# Patient Record
Sex: FEMALE | Race: ASIAN
[De-identification: names, ages, dates, MRNs, and addresses within clinical notes are randomized per-mention and may not be internally consistent; named-entity substitution may affect disease eponyms.]

---

## 2018-07-09 ENCOUNTER — HOSPITAL ENCOUNTER (OUTPATIENT)
Dept: HOSPITAL 62 - WI | Age: 53
End: 2018-07-09
Attending: SPECIALIST
Payer: COMMERCIAL

## 2018-07-09 DIAGNOSIS — Z12.31: Primary | ICD-10-CM

## 2018-07-09 PROCEDURE — 77067 SCR MAMMO BI INCL CAD: CPT

## 2018-07-09 PROCEDURE — 77063 BREAST TOMOSYNTHESIS BI: CPT

## 2018-07-11 ENCOUNTER — HOSPITAL ENCOUNTER (EMERGENCY)
Dept: HOSPITAL 62 - ER | Age: 53
LOS: 1 days | Discharge: HOME | End: 2018-07-12
Payer: COMMERCIAL

## 2018-07-11 DIAGNOSIS — R51: Primary | ICD-10-CM

## 2018-07-11 DIAGNOSIS — I10: ICD-10-CM

## 2018-07-11 DIAGNOSIS — Z88.6: ICD-10-CM

## 2018-07-11 DIAGNOSIS — Z88.2: ICD-10-CM

## 2018-07-11 DIAGNOSIS — R11.10: ICD-10-CM

## 2018-07-11 PROCEDURE — 89050 BODY FLUID CELL COUNT: CPT

## 2018-07-11 PROCEDURE — 84484 ASSAY OF TROPONIN QUANT: CPT

## 2018-07-11 PROCEDURE — 009U3ZX DRAINAGE OF SPINAL CANAL, PERCUTANEOUS APPROACH, DIAGNOSTIC: ICD-10-PCS

## 2018-07-11 PROCEDURE — 83735 ASSAY OF MAGNESIUM: CPT

## 2018-07-11 PROCEDURE — 93010 ELECTROCARDIOGRAM REPORT: CPT

## 2018-07-11 PROCEDURE — 96375 TX/PRO/DX INJ NEW DRUG ADDON: CPT

## 2018-07-11 PROCEDURE — 96361 HYDRATE IV INFUSION ADD-ON: CPT

## 2018-07-11 PROCEDURE — 85025 COMPLETE CBC W/AUTO DIFF WBC: CPT

## 2018-07-11 PROCEDURE — 62270 DX LMBR SPI PNXR: CPT

## 2018-07-11 PROCEDURE — 70450 CT HEAD/BRAIN W/O DYE: CPT

## 2018-07-11 PROCEDURE — 80053 COMPREHEN METABOLIC PANEL: CPT

## 2018-07-11 PROCEDURE — 81001 URINALYSIS AUTO W/SCOPE: CPT

## 2018-07-11 PROCEDURE — 99284 EMERGENCY DEPT VISIT MOD MDM: CPT

## 2018-07-11 PROCEDURE — S0119 ONDANSETRON 4 MG: HCPCS

## 2018-07-11 PROCEDURE — 36415 COLL VENOUS BLD VENIPUNCTURE: CPT

## 2018-07-11 PROCEDURE — 96374 THER/PROPH/DIAG INJ IV PUSH: CPT

## 2018-07-11 PROCEDURE — 93005 ELECTROCARDIOGRAM TRACING: CPT

## 2018-07-11 PROCEDURE — 96372 THER/PROPH/DIAG INJ SC/IM: CPT

## 2018-07-11 PROCEDURE — 82550 ASSAY OF CK (CPK): CPT

## 2018-07-11 PROCEDURE — 70544 MR ANGIOGRAPHY HEAD W/O DYE: CPT

## 2018-07-11 NOTE — ER DOCUMENT REPORT
ED Medical Screen (RME)





- General


Chief Complaint: High Blood Pressure


Stated Complaint: BLOOD PRESSURE ISSUES


Time Seen by Provider: 07/11/18 23:16


Mode of Arrival: Wheelchair


Information source: Patient


Notes: 





53-year-old female presented to ED for the complaint of severe headache jaw 

pain after she was outside for about 6 hours.  She states her head started 

throbbing and she took her blood pressure and it was very high.  It is been 

over 200 since that time.  When I checked her blood pressure in the pit area.  

Her blood pressure was 187/95.  She is alert and oriented respirations regular 

and unlabored speaking in full sentences.  She states she feels very weak and 

she is in a wheelchair.  Lungs were clear to auscultation.  States she has 

taken her blood pressure medicine lisinopril and a pain medicine tramadol still 

has a bad headache.








I have greeted and performed a rapid initial assessment of this patient.  A 

comprehensive ED assessment and evaluation of the patient, analysis of test 

results and completion of medical decision making process will be conducted by 

an additional ED providers.


TRAVEL OUTSIDE OF THE [place] IN LAST 30 DAYS: No





- Related Data


Allergies/Adverse Reactions: 


 





codeine Allergy (Verified 03/09/17 11:43)


 


Sulfa (Sulfonamide Antibiotics) Allergy (Verified 03/09/17 11:43)


 











Past Medical History





- Past Medical History


Cardiac Medical History: Reports: Hx Hypertension


Neurological Medical History: Reports: Hx Migraine


Psychiatric Medical History: Reports: Hx Depression


Past Surgical History: Reports: Hx Orthopedic Surgery - back





- Immunizations


Hx Diphtheria, Pertussis, Tetanus Vaccination: Yes





Physical Exam





- Vital signs


Vitals: 





 











Temp Pulse Resp BP Pulse Ox


 


 98.0 F   95   18   223/122 H  98 


 


 07/11/18 22:27  07/11/18 22:27  07/11/18 22:27  07/11/18 22:27  07/11/18 22:27














Course





- Vital Signs


Vital signs: 





 











Temp Pulse Resp BP Pulse Ox


 


 98.0 F   95   18   187/95 H  98 


 


 07/11/18 22:27  07/11/18 22:27  07/11/18 22:27  07/11/18 23:13  07/11/18 22:27














Doctor's Discharge





- Discharge


Referrals: 


JAQUELINE TAPIA MD [Primary Care Provider] - Follow up as needed

## 2018-07-11 NOTE — ER DOCUMENT REPORT
ED General





- General


Chief Complaint: High Blood Pressure


Stated Complaint: BLOOD PRESSURE ISSUES


Time Seen by Provider: 07/11/18 23:16


Mode of Arrival: Wheelchair


Notes: 





Patient is 53-year-old female presents with plan of headache and high blood 

pressure.  Patient says that she does have blood pressure medicine prescribed 

for her but she only takes it when her blood pressure is high.  She did take it 

today when she knows her blood pressure is high.  She is on lisinopril and 

hydrochlorothiazide.  She said most days she does not have to take her blood 

pressure as her blood pressure only elevates if she is having pain.  She was 

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  She 

started to develop a headache which gradually worsened throughout the day.  

Became more severe and then she started having vomiting therefore came into the 

ER.  She does have history of migraines but says her migraines are different 

and that they usually calls blurred vision.  She used to be on Fioricet for 

migraines which was prescribed by her neurologist, Dr. Cain.  She does not 

have any Fioricet at this time.  She denies any fevers.  No trauma to her head.

  No other complaints at this time.  No focal weakness or numbness.  She says 

with the headache she has a sensation of clenching throughout her jaw.


TRAVEL OUTSIDE OF THE [place] IN LAST 30 DAYS: No





- Related Data


Allergies/Adverse Reactions: 


 





codeine Allergy (Verified 03/09/17 11:43)


 


Sulfa (Sulfonamide Antibiotics) Allergy (Verified 03/09/17 11:43)


 











Past Medical History





- General


Information source: Patient





- Social History


Smoking Status: Never Smoker


Frequency of alcohol use: None


Drug Abuse: None


Family History: Other





- Past Medical History


Cardiac Medical History: Reports: Hx Hypertension


Neurological Medical History: Reports: Hx Migraine


Psychiatric Medical History: Reports: Hx Depression


Past Surgical History: Reports: Hx Orthopedic Surgery - back





- Immunizations


Hx Diphtheria, Pertussis, Tetanus Vaccination: Yes





Review of Systems





- Review of Systems


Notes: 





My Normal Review Basic





REVIEW OF SYSTEMS:


CONSTITUTIONAL :  Denies fever,  chills, or sweats.  Denies recent illness.


EENT:   Denies eye, ear, throat, or mouth pain or symptoms.  Denies nasal or 

sinus congestion.


CARDIOVASCULAR:  Denies chest pain.


RESPIRATORY:  Denies cough, cold, or chest congestion.  Denies shortness of 

breath, difficulty breathing, or wheezing.


GASTROINTESTINAL:  Denies abdominal pain.  Some vomiting


MUSCULOSKELETAL:  Denies neck or back pain or joint pain or swelling.


SKIN:   Denies rash or skin lesions.


NEUROLOGICAL:  Denies altered mental status or loss of consciousness.  Has a 

headache.  Denies weakness or paralysis or loss of use of either side.  Denies 

problems with gait or speech.  Denies sensory or motor loss.


ALL OTHER SYSTEMS REVIEWED AND NEGATIVE.





Physical Exam





- Vital signs


Vitals: 


 











Temp Pulse Resp BP Pulse Ox


 


 98.0 F   95   18   223/122 H  98 


 


 07/11/18 22:27  07/11/18 22:27  07/11/18 22:27  07/11/18 22:27  07/11/18 22:27














- Notes


Notes: 





General Appearance: Well nourished, alert, cooperative, no acute distress, 

moderate obvious discomfort.


Vitals: reviewed, See vital signs table.


Head: no swelling or tenderness to the head


Eyes: PERRL, EOMI, Conjuctiva clear


Mouth: No decreasd moisture


Throat: No tonsillar inflammation, No airway obstruction,  No lymphadenopathy


Neck: Supple, no neck tenderness


Lungs: No wheezing, No rales, No rhonci, No accessory muscle use, good air 

exchange bilaterally.


Heart: Normal rate, Regular rythm, No murmur, no rub


Abdomen: Normal BS, soft, No rigidity, No abdominal tenderness, No guarding, no 

rebound, 


Extremities: strength 5/5 in all extremities, good pulses in all extremities, 

no swelling or tenderness in the extremities, no edema.


Skin: warm, dry, appropriate color, no rash


Neuro: speech clear, oriented x 3, normal affect, responds appropriately to 

questions.  No nerves II through XII are intact.  Distal sensation intact.  

Patient moves all extremities without difficulty.  Normal Romberg.





Course





- Re-evaluation


Re-evalutation: 





07/12/18 02:19


On reevaluation the patient's blood pressure is much improved but she still has 

a bad headache as well as still some vomiting.  She has a history of migraines 

and took Fioricet for migraines but says this headache is little different than 

her migraines and that her migraines usually cause visual changes and this 

headache is not causing any visual changes.  I will try Benadryl and Reglan 

because of the vomiting.  Hopefully this will help.  If not, then will try 

Fioricet.  Patient's CT scan did not show any concerning findings


07/12/18 03:39








Patient says her headache is improving but still there.  I will give her a dose 

of Fioricet as this has worked for her with her migraines in the past.  She 

still has a lot of nausea.  I will try Zofran and see if this helps.


07/12/18 04:56








Patient still has the headache.  Initially in patient's history she told me 

that it was a gradual onset headache.  I have assessed her and I am concerned 

that she continues have a headache despite multiple different kind of 

medications.  I asked her again about the headache and one of her history 

again.  Patient also is that the headache was actually sudden in onset.  This 

makes me more concerned for possible subarachnoid hemorrhage.  I did talk to 

patient about lumbar puncture and set I strongly recommended that we go forward 

with this and she is agreeable to it.  Is not taking any blood thinning 

medications.  She has had epidurals in the past.  She is understanding of the 

risks and benefits.





Dictation of this chart was performed using voice recognition software; 

therefore, there may be some unintended grammatical errors.


07/12/18 05:28








Lumbar puncture is completed.  Patient tolerated well.  We will have patient 

lay flat and give her a liter of IV fluids.


07/13/18 05:43








She has lumbar puncture suggested this was not a subarachnoid hemorrhage or 

bleeding.  Her headache was started become much better at the time when I 

believe the morning.  I did order MRI just to rule out venous osteoporosis.  

She was checked out the morning provider, , that she be discharged home 

if her MRI was negative and she was feeling improved.  Patient's MRI was 

negative and she was discharged home.  A wrote her a prescription for Fioricet 

as this has worked for migraines in the past.  I suspect most likely her 

headache was related to heat exhaustion.  Talked to the patient for left 

informed of the plan to get the MRI.  Also informed of my concern at this point 

was heat exhaustion related that she should feel heat for the next several 

days.  The time patient admitted that she was feeling better did want to go 

home after the MRI.  Informed her that should I still want to have a low 

threshold to return to ER if she has worsening recurrent headaches, vomiting, 

or if she felt unwell.  Patient agreed with plan.





Dictation of this chart was performed using voice recognition software; 

therefore, there may be some unintended grammatical errors.





- Vital Signs


Vital signs: 


 











Temp Pulse Resp BP Pulse Ox


 


 97.6 F   95   25 H  119/82   99 


 


 07/12/18 06:10  07/11/18 22:27  07/12/18 08:05  07/12/18 09:29  07/12/18 09:29














- Laboratory


Result Diagrams: 


 07/11/18 23:50





 07/12/18 00:42


Laboratory results interpreted by me: 


 











  07/11/18 07/12/18 07/12/18





  23:50 00:09 00:42


 


Seg Neutrophils %  81.0 H  


 


Glucose    118 H


 


Urine Ketones   20 H 














- EKG Interpretation by Me


Additional EKG results interpreted by me: 





07/11/18 23:29


EKG is reviewed and interpreted by me.  EKG shows sinus rhythm with rate of 79 

bpm.  No ST segment elevation or depression.  No ischemic T wave inversions.  

NY interval, QRS duration, QTc intervals are within normal range.  No acute 

changes comparison patient's old EKG from March 10, 2017.





Procedures





- Lumbar Puncture


  ** Lumbar puncture


Consent obtained: Yes


Lumbar puncture pre-procedure: Chloraprep applied


Patient position: Lying


Lumbar puncture location: L4-L5 interspace


Anesthetic type: 1% Lidocaine


mL's of anesthetic: 2


Amount/type of drainage: clear 6mls


Number of attempts: 1


Complications: No





Discharge





- Discharge


Clinical Impression: 


 Essential hypertension





Headache


Qualifiers:


 Headache type: unspecified Headache chronicity pattern: acute headache 

Intractability: not intractable Qualified Code(s): R51 - Headache





Disposition: HOME, SELF-CARE


Additional Instructions: 


Please stay out of the heat and rest over the next 24 hours. Please return to 

the ER if you have worsening recurrent headaches, intractable vomiting, fevers, 

or feel unwell. Do not drive when taking the Fioricet.


Prescriptions: 


Butalb/Acetaminophen/Caffeine [Fioricet (-40 mg) Tablet] 1 tab PO Q4HP 

PRN #15 tab


 PRN Reason: 


Forms:  Return to Work


Referrals: 


JAQUELINE TAPIA MD [EMERITUS] - Follow up in 3-5 days

## 2018-07-11 NOTE — WOMENS IMAGING REPORT
EXAM DESCRIPTION:  3D SCREENING MAMMO BILAT



COMPLETED DATE/TIME:  7/9/2018 2:52 pm



REASON FOR STUDY:  BILATERAL MAMMO SCREENING 3D/ Z12.31 Z12.31  ENCNTR SCREEN MAMMOGRAM FOR MALIGNANT
 NEOPLASM OF WOJCIECH



COMPARISON:  Multiple since 2009



TECHNIQUE:  Standard craniocaudal and mediolateral oblique views of each breast recorded using digita
l acquisition and breast tomosynthesis.



LIMITATIONS:  None.



FINDINGS:  No masses, calcifications or architectural distortion. No areas of suspicion.

Read with the assistance of CAD.

.[facility] - R2 Cenova Version 1.3

.[facility] - R2 Cenova Version 1.3

.[facility] - R2 Cenova Version 2.4

.[facility] - R2 Cenova Version 2.4

.[facility] - R2  Version 9.2



IMPRESSION:  NORMAL MAMMOGRAM.  BIRADS 1.



BREAST DENSITY:  d. The breasts are extremely dense, which lowers the sensitivity of mammography.



BIRAD:  1 NEGATIVE



RECOMMENDATION:  ROUTINE SCREENING

Please continue yearly bilateral screening tomosynthesis in July 2019



COMMENT:  The patient has been notified of the results by letter per SA requirements. Additional no
tification policies are in place for contacting patient with suspicious or incomplete findings.

Quality ID #[id number]: The American College of Radiology recommends an annual screening mammogram for women
 aged 40 years or over. This facility utilizes a reminder system to ensure that all patients receive 
reminder letters, and/or direct phone calls for appointments. This includes reminders for routine scr
eening mammograms, diagnostic mammograms, or other Breast Imaging Interventions when appropriate.  Th
is patient will be placed in the appropriate reminder system.

The American College of Radiology (ACR) has developed recommendations for screening MRI of the breast
s in certain patient populations, to be used in conjunction with mammography.  Breast MRI surveillanc
e may be appropriate for women with more than 20% lifetime risk of developing breast cancer  as deter
mined by genetic testing, significant family history of the disease, or history of mantle radiation f
or Hodgkins Disease.  ACR Practice Guidelines 2008.

[employer]



PQRS 6045F: Fluoroscopic imaging is not utilized for breast tomosynthesis.



TECHNICAL DOCUMENTATION:  FINDING NUMBER: (1)

ASSESSMENT:  (1)

JOB ID:  [id number]

 2011 [employer]- All Rights Reserved



Reading location - IP/workstation name: [facility]

## 2018-07-12 VITALS — SYSTOLIC BLOOD PRESSURE: 119 MMHG | DIASTOLIC BLOOD PRESSURE: 82 MMHG

## 2018-07-12 LAB
ADD MANUAL DIFF: NO
ALBUMIN SERPL-MCNC: 4.7 G/DL (ref 3.5–5)
ALP SERPL-CCNC: 85 U/L (ref 38–126)
ALT SERPL-CCNC: 20 U/L (ref 9–52)
ANION GAP SERPL CALC-SCNC: 16 MMOL/L (ref 5–19)
APPEARANCE ALL TUBES: CLEAR
APPEARANCE ALL TUBES: CLEAR
APPEARANCE UR: CLEAR
APTT PPP: (no result) S
AST SERPL-CCNC: 26 U/L (ref 14–36)
BASOPHILS # BLD AUTO: 0 10^3/UL (ref 0–0.2)
BASOPHILS NFR BLD AUTO: 0.3 % (ref 0–2)
BILIRUB DIRECT SERPL-MCNC: 0.3 MG/DL (ref 0–0.4)
BILIRUB SERPL-MCNC: 0.6 MG/DL (ref 0.2–1.3)
BILIRUB UR QL STRIP: NEGATIVE
BUN SERPL-MCNC: 10 MG/DL (ref 7–20)
CALCIUM: 9.5 MG/DL (ref 8.4–10.2)
CHLORIDE SERPL-SCNC: 99 MMOL/L (ref 98–107)
CK SERPL-CCNC: 85 U/L (ref 30–135)
CO2 SERPL-SCNC: 25 MMOL/L (ref 22–30)
COLOR ALL TUBES: COLORLESS
COLOR ALL TUBES: COLORLESS
CSF TOTAL VOLUME: 7 CC
CSF TOTAL VOLUME: 7 CC
CSF TUBE NUMBER: 1
CSF TUBE NUMBER: 3
EOSINOPHIL # BLD AUTO: 0 10^3/UL (ref 0–0.6)
EOSINOPHIL NFR BLD AUTO: 0.4 % (ref 0–6)
ERYTHROCYTE [DISTWIDTH] IN BLOOD BY AUTOMATED COUNT: 12.7 % (ref 11.5–14)
GLUCOSE SERPL-MCNC: 118 MG/DL (ref 75–110)
GLUCOSE UR STRIP-MCNC: NEGATIVE MG/DL
HCT VFR BLD CALC: 42.5 % (ref 36–47)
HGB BLD-MCNC: 14.5 G/DL (ref 12–15.5)
KETONES UR STRIP-MCNC: 20 MG/DL
LYMPHOCYTES # BLD AUTO: 1.3 10^3/UL (ref 0.5–4.7)
LYMPHOCYTES NFR BLD AUTO: 14.1 % (ref 13–45)
MCH RBC QN AUTO: 28.6 PG (ref 27–33.4)
MCHC RBC AUTO-ENTMCNC: 34 G/DL (ref 32–36)
MCV RBC AUTO: 84 FL (ref 80–97)
MONOCYTES # BLD AUTO: 0.4 10^3/UL (ref 0.1–1.4)
MONOCYTES NFR BLD AUTO: 4.2 % (ref 3–13)
NEUTROPHILS # BLD AUTO: 7.6 10^3/UL (ref 1.7–8.2)
NEUTS SEG NFR BLD AUTO: 81 % (ref 42–78)
NITRITE UR QL STRIP: NEGATIVE
PH UR STRIP: 6 [PH] (ref 5–9)
PLATELET # BLD: 260 10^3/UL (ref 150–450)
POTASSIUM SERPL-SCNC: 4.1 MMOL/L (ref 3.6–5)
PROT SERPL-MCNC: 8.1 G/DL (ref 6.3–8.2)
PROT UR STRIP-MCNC: NEGATIVE MG/DL
RBC # BLD AUTO: 5.06 10^6/UL (ref 3.72–5.28)
SODIUM SERPL-SCNC: 140.2 MMOL/L (ref 137–145)
SP GR UR STRIP: 1.01
TOTAL CELLS COUNTED % (AUTO): 100 %
UROBILINOGEN UR-MCNC: NEGATIVE MG/DL (ref ?–2)
VOLUME TUBE 1: 1.5 CC
VOLUME TUBE 1: 1.5 CC
VOLUME TUBE 2: 2 CC
VOLUME TUBE 2: 2 CC
VOLUME TUBE 3: 1.5 CC
VOLUME TUBE 3: 1.5 CC
VOLUME TUBE 4: 2 CC
VOLUME TUBE 4: 2 CC
WBC # BLD AUTO: 9.4 10^3/UL (ref 4–10.5)

## 2018-07-12 NOTE — RADIOLOGY REPORT (SQ)
EXAM DESCRIPTION: 



CT HEAD WITHOUT IV CONTRAST



COMPLETED DATE/TME:  07/11/2018 23:37



CLINICAL HISTORY: headache



COMPARISON: None available



TECHNIQUE: Axial CT of the head obtained from the skull apex to

the skull base without contrast.



FINDINGS: 

No acute intracranial hemorrhage identified. No mass, mass

effect, shift of the midline, abnormal extra-axial fluid

collection or CT evidence of acute ischemic change identified.

The ventricular system is unremarkable.  No acute abnormalities

of the supratentorial white matter, basal ganglia, cerebellum, or

brainstem.



The visualized paranasal sinuses and the mastoids are clear.

  No skull fracture identified.  Visualized orbits and globes are

unremarkable.



DLP:1070.38 mGy-cm



IMPRESSION:

1.  No acute intracranial abnormality identified.



This exam was performed according to our departmental

dose-optimization program, which includes automated exposure

control, adjustment of the mA and/or kV according to patient size

and/or use of iterative reconstruction technique.

## 2018-07-12 NOTE — RADIOLOGY REPORT (SQ)
EXAM DESCRIPTION:  MRA HEAD WITHOUT



COMPLETED DATE/TIME:  7/12/2018 8:40 am



REASON FOR STUDY:  venous phase to rule out venous sinus thrombosis



COMPARISON:  None.



TECHNIQUE:  Axial 3-D time-of-flight acquisition imaging performed through the brain in the venous si
nuses.  Images reformatted using 3-D MIPS.



LIMITATIONS:  None.



FINDINGS:  SOURCE IMAGES: No unexpected findings on source images.  No large masses.

3-D MIP: Normal flow be in the cerebral venous sinuses.  Specifically no evidence for sagittal sinus 
thrombosis.

OTHER: No other significant finding.



IMPRESSION:  Normal MRV of the cerebral venous sinuses.



TECHNICAL DOCUMENTATION:  JOB ID:  [id number]

 2011 [employer]- All Rights Reserved



Reading location - IP/workstation name: [email or web address]

## 2018-07-23 ENCOUNTER — HOSPITAL ENCOUNTER (EMERGENCY)
Dept: HOSPITAL 62 - ER | Age: 53
Discharge: HOME | End: 2018-07-23
Payer: COMMERCIAL

## 2018-07-23 VITALS — SYSTOLIC BLOOD PRESSURE: 145 MMHG | DIASTOLIC BLOOD PRESSURE: 86 MMHG

## 2018-07-23 DIAGNOSIS — R07.9: ICD-10-CM

## 2018-07-23 DIAGNOSIS — R55: Primary | ICD-10-CM

## 2018-07-23 DIAGNOSIS — Z88.5: ICD-10-CM

## 2018-07-23 DIAGNOSIS — Z88.2: ICD-10-CM

## 2018-07-23 DIAGNOSIS — I10: ICD-10-CM

## 2018-07-23 LAB
ADD MANUAL DIFF: NO
ALBUMIN SERPL-MCNC: 4.8 G/DL (ref 3.5–5)
ALP SERPL-CCNC: 90 U/L (ref 38–126)
ALT SERPL-CCNC: 21 U/L (ref 9–52)
ANION GAP SERPL CALC-SCNC: 14 MMOL/L (ref 5–19)
APPEARANCE UR: (no result)
APTT PPP: YELLOW S
AST SERPL-CCNC: 20 U/L (ref 14–36)
BARBITURATES UR QL SCN: NEGATIVE
BASOPHILS # BLD AUTO: 0 10^3/UL (ref 0–0.2)
BASOPHILS NFR BLD AUTO: 0.2 % (ref 0–2)
BILIRUB DIRECT SERPL-MCNC: 0.3 MG/DL (ref 0–0.4)
BILIRUB SERPL-MCNC: 0.6 MG/DL (ref 0.2–1.3)
BILIRUB UR QL STRIP: NEGATIVE
BUN SERPL-MCNC: 17 MG/DL (ref 7–20)
CALCIUM: 9.6 MG/DL (ref 8.4–10.2)
CHLORIDE SERPL-SCNC: 100 MMOL/L (ref 98–107)
CK MB SERPL-MCNC: 0.35 NG/ML (ref ?–4.55)
CK SERPL-CCNC: 63 U/L (ref 30–135)
CO2 SERPL-SCNC: 26 MMOL/L (ref 22–30)
EOSINOPHIL # BLD AUTO: 0 10^3/UL (ref 0–0.6)
EOSINOPHIL NFR BLD AUTO: 0.7 % (ref 0–6)
ERYTHROCYTE [DISTWIDTH] IN BLOOD BY AUTOMATED COUNT: 13 % (ref 11.5–14)
GLUCOSE SERPL-MCNC: 101 MG/DL (ref 75–110)
GLUCOSE UR STRIP-MCNC: NEGATIVE MG/DL
HCT VFR BLD CALC: 41.3 % (ref 36–47)
HGB BLD-MCNC: 13.9 G/DL (ref 12–15.5)
KETONES UR STRIP-MCNC: (no result) MG/DL
LYMPHOCYTES # BLD AUTO: 2 10^3/UL (ref 0.5–4.7)
LYMPHOCYTES NFR BLD AUTO: 30.5 % (ref 13–45)
MCH RBC QN AUTO: 28.6 PG (ref 27–33.4)
MCHC RBC AUTO-ENTMCNC: 33.7 G/DL (ref 32–36)
MCV RBC AUTO: 85 FL (ref 80–97)
METHADONE UR QL SCN: NEGATIVE
MONOCYTES # BLD AUTO: 0.3 10^3/UL (ref 0.1–1.4)
MONOCYTES NFR BLD AUTO: 4.9 % (ref 3–13)
NEUTROPHILS # BLD AUTO: 4.1 10^3/UL (ref 1.7–8.2)
NEUTS SEG NFR BLD AUTO: 63.7 % (ref 42–78)
NITRITE UR QL STRIP: NEGATIVE
PCP UR QL SCN: NEGATIVE
PH UR STRIP: 5 [PH] (ref 5–9)
PLATELET # BLD: 308 10^3/UL (ref 150–450)
POTASSIUM SERPL-SCNC: 4.6 MMOL/L (ref 3.6–5)
PROT SERPL-MCNC: 8.6 G/DL (ref 6.3–8.2)
PROT UR STRIP-MCNC: NEGATIVE MG/DL
RBC # BLD AUTO: 4.87 10^6/UL (ref 3.72–5.28)
SODIUM SERPL-SCNC: 140 MMOL/L (ref 137–145)
SP GR UR STRIP: 1.01
TOTAL CELLS COUNTED % (AUTO): 100 %
TROPONIN I SERPL-MCNC: 0.04 NG/ML
URINE AMPHETAMINES SCREEN: NEGATIVE
URINE BENZODIAZEPINES SCREEN: NEGATIVE
URINE COCAINE SCREEN: NEGATIVE
URINE MARIJUANA (THC) SCREEN: NEGATIVE
UROBILINOGEN UR-MCNC: NEGATIVE MG/DL (ref ?–2)
WBC # BLD AUTO: 6.5 10^3/UL (ref 4–10.5)

## 2018-07-23 PROCEDURE — A9540 TC99M MAA: HCPCS

## 2018-07-23 PROCEDURE — 93005 ELECTROCARDIOGRAM TRACING: CPT

## 2018-07-23 PROCEDURE — 99285 EMERGENCY DEPT VISIT HI MDM: CPT

## 2018-07-23 PROCEDURE — 78582 LUNG VENTILAT&PERFUS IMAGING: CPT

## 2018-07-23 PROCEDURE — 80307 DRUG TEST PRSMV CHEM ANLYZR: CPT

## 2018-07-23 PROCEDURE — 85025 COMPLETE CBC W/AUTO DIFF WBC: CPT

## 2018-07-23 PROCEDURE — 36415 COLL VENOUS BLD VENIPUNCTURE: CPT

## 2018-07-23 PROCEDURE — 71046 X-RAY EXAM CHEST 2 VIEWS: CPT

## 2018-07-23 PROCEDURE — 84484 ASSAY OF TROPONIN QUANT: CPT

## 2018-07-23 PROCEDURE — 80053 COMPREHEN METABOLIC PANEL: CPT

## 2018-07-23 PROCEDURE — 82553 CREATINE MB FRACTION: CPT

## 2018-07-23 PROCEDURE — 81001 URINALYSIS AUTO W/SCOPE: CPT

## 2018-07-23 PROCEDURE — 84443 ASSAY THYROID STIM HORMONE: CPT

## 2018-07-23 PROCEDURE — A9567 TECHNETIUM TC-99M AEROSOL: HCPCS

## 2018-07-23 PROCEDURE — 82550 ASSAY OF CK (CPK): CPT

## 2018-07-23 PROCEDURE — 85379 FIBRIN DEGRADATION QUANT: CPT

## 2018-07-23 PROCEDURE — 96360 HYDRATION IV INFUSION INIT: CPT

## 2018-07-23 PROCEDURE — 93010 ELECTROCARDIOGRAM REPORT: CPT

## 2018-07-23 NOTE — ER DOCUMENT REPORT
ED General





- General


Chief Complaint: Near Syncope


Stated Complaint: CHEST PAIN


Time Seen by Provider: 07/23/18 11:58


TRAVEL OUTSIDE OF THE [place] IN LAST 30 DAYS: No





- HPI


Patient complains to provider of: Chest pain syncopal episode


Notes: 





Patient coming in stating that she had 2 syncopal episodes today.  Patient 

states the first 1 she was in the bathroom and passed out has worked a little 

bit of pains in her chest when back to sleep and then passed out again in her 

sleep.  Patient coming in for further evaluation.  Patient upon my evaluation 

denies any chest pain denies any recent travel denies any recent trauma.  

Patient was recently seen in the ER here with a significant workup performed 

for headache.  Workup at that time was negative.  Patient states she has not 

yet followed up with her primary care physician.  Denies any recent travel 

denies any recent trauma denies fevers chills nausea vomiting diarrhea





- Related Data


Allergies/Adverse Reactions: 


 





codeine Allergy (Verified 07/23/18 12:03)


 


morphine Allergy (Verified 07/23/18 12:03)


 Hallucinations


Sulfa (Sulfonamide Antibiotics) Allergy (Verified 07/23/18 12:03)


 











Past Medical History





- Social History


Smoking Status: Never Smoker


Chew tobacco use (# tins/day): No


Frequency of alcohol use: None


Drug Abuse: None


Family History: Other


Patient has suicidal ideation: No


Patient has homicidal ideation: No





- Past Medical History


Cardiac Medical History: Reports: Hx Hypertension


Neurological Medical History: Reports: Hx Migraine


Renal/ Medical History: Denies: Hx Peritoneal Dialysis


Psychiatric Medical History: Reports: Hx Depression


Past Surgical History: Reports: Hx Orthopedic Surgery - back





- Immunizations


Hx Diphtheria, Pertussis, Tetanus Vaccination: Yes





Review of Systems





- Review of Systems


Constitutional: No symptoms reported


EENT: No symptoms reported


Cardiovascular: Chest pain, Syncope


Respiratory: No symptoms reported


Gastrointestinal: No symptoms reported


Genitourinary: No symptoms reported


Female Genitourinary: No symptoms reported


Musculoskeletal: No symptoms reported


Skin: No symptoms reported


Hematologic/Lymphatic: No symptoms reported


Neurological/Psychological: No symptoms reported


-: Yes All other systems reviewed and negative





Physical Exam





- Vital signs


Vitals: 


 











Temp Pulse Resp BP Pulse Ox


 


 97.5 F   81   18   128/65 H  97 


 


 07/23/18 11:42  07/23/18 11:42  07/23/18 11:42  07/23/18 11:42  07/23/18 11:42











Interpretation: Normal





- General


General appearance: Appears well, Alert





- HEENT


Head: Normocephalic, Atraumatic


Eyes: Normal


Pupils: PERRL





- Respiratory


Respiratory status: No respiratory distress


Chest status: Nontender


Breath sounds: Normal


Chest palpation: Normal





- Cardiovascular


Rhythm: Regular


Heart sounds: Normal auscultation


Murmur: No





- Abdominal


Inspection: Normal


Distension: No distension


Bowel sounds: Normal


Tenderness: Nontender


Organomegaly: No organomegaly





- Back


Back: Normal, Nontender





- Extremities


General upper extremity: Normal inspection, Nontender, Normal color, Normal ROM

, Normal temperature


General lower extremity: Normal inspection, Nontender, Normal color, Normal ROM

, Normal temperature, Normal weight bearing.  No: Dion's sign





- Neurological


Neuro grossly intact: Yes


Cognition: Normal


Orientation: AAOx4


Bulmaro Coma Scale Eye Opening: Spontaneous


Bulmaro Coma Scale Verbal: Oriented


Bulmaro Coma Scale Motor: Obeys Commands


Bulmaro Coma Scale Total: 15


Speech: Normal


Motor strength normal: LUE, RUE, LLE, RLE


Sensory: Normal





- Psychological


Associated symptoms: Normal affect, Normal mood





- Skin


Skin Temperature: Warm


Skin Moisture: Dry


Skin Color: Normal





Course





- Re-evaluation


Re-evalutation: 





07/23/18 20:15


The patient has atypical chest pain as the patient's chest pain is not 

suggestive of pulmonary embolus, cardiac ischemia, aortic dissection, or other 

serious etiology. Given the extremely low risk of these diagnoses further 

testing and evaluation for these possibilities does not appear to be indicated 

at this time. The patient has been instructed to return if the symptoms worsen 

or change in any way.  No clear etiology for the patient's symptoms.  Upon last 

evaluation troponins trending down EKG still negative patient still 

asymptomatic agree to discharge discharge home to follow-up with cardiology 

listed in primary care.





- Vital Signs


Vital signs: 


 











Temp Pulse Resp BP Pulse Ox


 


 97.7 F   87   16   145/86 H  100 


 


 07/23/18 18:30  07/23/18 18:30  07/23/18 18:30  07/23/18 18:30  07/23/18 18:30














- Laboratory


Result Diagrams: 


 07/23/18 16:00





 07/23/18 14:46


Laboratory results interpreted by me: 


 











  07/23/18 07/23/18 07/23/18





  14:20 14:46 14:46


 


D-Dimer    0.54 H


 


Total Protein   8.6 H 


 


Urine Ketones  TRACE H  














Discharge





- Discharge


Clinical Impression: 


Chest pain


Qualifiers:


 Chest pain type: unspecified Qualified Code(s): R07.9 - Chest pain, unspecified





Syncope


Qualifiers:


 Syncope type: unspecified Qualified Code(s): R55 - Syncope and collapse





Condition: Good


Disposition: HOME, SELF-CARE


Instructions:  Chest Wall Pain ([facility]), Chest Pain of Unclear Cause ([facility]), 

Syncopal Episode ([facility])


Additional Instructions: 


At this time your lab work shows no signs of cardiac ischemia signs of blood 

clots within your lungs no signs of cardiac damage infection causing your 

episode today.  I would highly recommend she follow-up with your primary care 

physician for further evaluation.  Please make sure you drink plenty water to 

stay hydrated return to the ER for any other concerns.


Referrals: 


ROVERTO DAS MD [ACTIVE STAFF] - Follow up as needed

## 2018-07-23 NOTE — RADIOLOGY REPORT (SQ)
EXAM DESCRIPTION:  NM LUNG VENT/PERF SCAN



COMPLETED DATE/TIME:  7/23/2018 5:35 pm



REASON FOR STUDY:  elevated d dimer



COMPARISON:  None.



RADIONUCLIDE AND DOSE:  4.91 millicuries TC-99m MAA  Intravenous

32.2 millicuries TC-99m DTPA Inhaled aerosol



TECHNIQUE:  Eight views of the lungs acquired post ventilation of DTPA aerosol.  Eight matching views
 of the lungs acquired following injection of MAA.



LIMITATIONS:  None.



FINDINGS:  VENTILATION: Symmetric but heterogeneous centralized distribution of DTPA aerosol during v
entilatory phase.  No significant areas of photopenia.

PERFUSION: Perfusion images with normal homogenous activity and no wedge-shaped or segmental defects.
  No ventilation-perfusion mismatches.

OTHER: No other significant finding.



IMPRESSION:  No ventilation-perfusion mismatches.



TECHNICAL DOCUMENTATION:  JOB ID:  [id number]

[id number]

 2011 [employer]- All Rights Reserved



Reading location - IP/workstation name: [email or web address]

## 2018-07-23 NOTE — RADIOLOGY REPORT (SQ)
EXAM DESCRIPTION:  CHEST 2 VIEWS



COMPLETED DATE/TIME:  7/23/2018 12:23 pm



REASON FOR STUDY:  sob



COMPARISON:  1/18/2010



EXAM PARAMETERS:  NUMBER OF VIEWS: two views

TECHNIQUE: Digital Frontal and Lateral radiographic views of the chest acquired.

RADIATION DOSE: NA

LIMITATIONS: none



FINDINGS:  LUNGS AND PLEURA: No opacities, masses or pneumothorax. No pleural effusion.

MEDIASTINUM AND HILAR STRUCTURES: No masses or contour abnormalities.

HEART AND VASCULAR STRUCTURES: Heart normal size.  No evidence for failure.

BONES: No acute findings.

HARDWARE: None in the chest.

OTHER: No other significant finding.



IMPRESSION:  1 No significant interval changes since the prior examination dated 1/18/2010.  No acute
 findings.



TECHNICAL DOCUMENTATION:  JOB ID:  [id number]

 2011 [employer]- All Rights Reserved



Reading location - IP/workstation name: [email or web address]

## 2020-01-31 ENCOUNTER — HOSPITAL ENCOUNTER (OUTPATIENT)
Dept: HOSPITAL 62 - WI | Age: 55
End: 2020-01-31
Attending: SPECIALIST
Payer: COMMERCIAL

## 2020-01-31 DIAGNOSIS — Z12.31: Primary | ICD-10-CM

## 2020-01-31 PROCEDURE — 77067 SCR MAMMO BI INCL CAD: CPT

## 2020-02-01 ENCOUNTER — HOSPITAL ENCOUNTER (EMERGENCY)
Dept: HOSPITAL 62 - ER | Age: 55
Discharge: HOME | End: 2020-02-01
Payer: COMMERCIAL

## 2020-02-01 VITALS — SYSTOLIC BLOOD PRESSURE: 194 MMHG | DIASTOLIC BLOOD PRESSURE: 105 MMHG

## 2020-02-01 DIAGNOSIS — I10: ICD-10-CM

## 2020-02-01 DIAGNOSIS — Z88.2: ICD-10-CM

## 2020-02-01 DIAGNOSIS — Z79.899: ICD-10-CM

## 2020-02-01 DIAGNOSIS — B86: Primary | ICD-10-CM

## 2020-02-01 DIAGNOSIS — Z88.8: ICD-10-CM

## 2020-02-01 PROCEDURE — 99282 EMERGENCY DEPT VISIT SF MDM: CPT

## 2020-02-01 NOTE — ER DOCUMENT REPORT
ED General





- General


Chief Complaint: Skin Problem


Stated Complaint: SKIN PROBLEM


Time Seen by Provider: 02/01/20 16:59


Primary Care Provider: 


CITLALY SANDHU DO [ACTIVE STAFF] - Follow up in 1 week (for dermatology follow 

up)


TRAVEL OUTSIDE OF THE [place] IN LAST 30 DAYS: No





- HPI


Notes: 





54-year-old female to the emergency department with complaints of progressively 

worsening rash for the past week.  She states it is intensely itchy.  She states

on her arms on her back on her neck.  She states she has been trying to use 

over-the-counter medicines but with no relief.  She states that she has not been

anywhere new or around anyone new.  She states she has not been changing any of 

her detergents or toiletries.  She has not eaten anywhere new.  She denies any 

fevers or chills.  Noted that she has an elevated blood pressure.  Patient has a

history of high blood pressure.  She states he took her medicine this morning.  

She denies any symptoms.





- Related Data


Allergies/Adverse Reactions: 


                                        





codeine Allergy (Verified 02/01/20 16:59)


   


morphine Allergy (Verified 02/01/20 16:59)


   Hallucinations


Sulfa (Sulfonamide Antibiotics) Allergy (Verified 02/01/20 16:59)


   











Past Medical History





- General


Information source: Patient





- Social History


Smoking Status: Never Smoker


Frequency of alcohol use: None


Drug Abuse: None


Family History: Reviewed & Not Pertinent, Other





- Past Medical History


Cardiac Medical History: Reports: Hx Hypertension


Neurological Medical History: Reports: Hx Migraine


Renal/ Medical History: Denies: Hx Peritoneal Dialysis


Psychiatric Medical History: Reports: Hx Depression


Past Surgical History: Reports: Hx Orthopedic Surgery - back





- Immunizations


Hx Diphtheria, Pertussis, Tetanus Vaccination: Yes





Review of Systems





- Review of Systems


Constitutional: denies: Chills, Fever


EENT: No symptoms reported


Cardiovascular: denies: Chest pain, Palpitations, Heart racing, Dizziness, 

Lightheaded


Respiratory: denies: Cough, Short of breath


Gastrointestinal: denies: Abdominal pain, Diarrhea, Nausea, Vomiting


Genitourinary: No symptoms reported


Musculoskeletal: No symptoms reported


Skin: See HPI, Rash


Hematologic/Lymphatic: No symptoms reported


Neurological/Psychological: No symptoms reported


-: Yes All other systems reviewed and negative





Physical Exam





- Vital signs


Vitals: 


                                        











Temp Pulse Resp BP Pulse Ox


 


 97.8 F   95   16   194/105 H  98 


 


 02/01/20 17:07  02/01/20 17:07  02/01/20 17:07  02/01/20 17:07  02/01/20 17:07











Interpretation: Hypertensive





- General


General appearance: Appears well, Alert


In distress: None





- HEENT


Head: Normocephalic, Atraumatic


Eyes: Normal


Pupils: PERRL





- Respiratory


Respiratory status: No respiratory distress


Chest status: Nontender


Breath sounds: Normal


Chest palpation: Normal





- Cardiovascular


Rhythm: Regular


Heart sounds: Normal auscultation


Murmur: No





- Abdominal


Inspection: Normal


Distension: No distension


Bowel sounds: Normal


Tenderness: Nontender


Organomegaly: No organomegaly





- Neurological


Neuro grossly intact: Yes


Cognition: Normal


Orientation: AAOx4


Reydon Coma Scale Eye Opening: Spontaneous


Reydon Coma Scale Verbal: Oriented


Bulmaro Coma Scale Motor: Obeys Commands


Bulmaro Coma Scale Total: 15


Speech: Normal


Cranial nerves: Normal


Cerebellar coordination: Normal.  No: Gait ataxia


Motor strength normal: LUE, RUE, LLE, RLE


Additional motor exam normals: Equal .  No: Pronator drift


Sensory: Normal





- Psychological


Associated symptoms: Normal affect, Normal mood





- Skin


Skin Temperature: Warm


Skin Moisture: Dry


Skin irregularity: Rash - There is a papular rash to the arms back neck with 

excoriations and linear burrowing.  There is no superimposed purulence or 

evidence for streaking erythema.  Rash is concerning for scabies





Course





- Re-evaluation


Re-evalutation: 





Impression: Scabies, hypertension.  Patient states she took her blood pressure 

medicine this morning.  She does not have a headache or any other focal 

neurological symptoms.  She denies any chest pain or shortness of breath.  Her 

rash is very consistent with scabies.  Will send home with permethrin.  Have 

encouraged her to use it without fail.  Have also sent her home with Atarax.  

Encouraged to return if she has any worsening symptoms.  Patient agrees with the

plan.








- Vital Signs


Vital signs: 


                                        











Temp Pulse Resp BP Pulse Ox


 


 97.8 F   95   16   194/105 H  98 


 


 02/01/20 17:07  02/01/20 17:07  02/01/20 17:07  02/01/20 17:07  02/01/20 17:07














Discharge





- Discharge


Clinical Impression: 


 Scabies





Hypertension


Qualifiers:


 Hypertension type: essential hypertension Qualified Code(s): I10 - Essential 

(primary) hypertension





Condition: Stable


Disposition: HOME, SELF-CARE


Instructions:  High Blood Pressure ([facility]), Scabies ([facility])


Additional Instructions: 


Use the cream as prescribed.  If cream does not help with your symptoms please 

follow-up with dermatology.  Take medicines for anti-itching.  Return if any 

fevers, weeping wounds.  Or any other concerns.  Continue to take your regular 

blood pressure medicine.


Prescriptions: 


Permethrin [Acticin 5% Cream 60 gm] 60 gm TP ASDIR #60 gm


Hydroxyzine Pamoate [Vistaril] 25 mg PO Q8H #20 capsule


Referrals: 


CITLALY SANDHU DO [ACTIVE STAFF] - Follow up in 1 week (for dermatology follow 

up)

## 2020-02-03 NOTE — WOMENS IMAGING REPORT
EXAM DESCRIPTION:  BILAT SCREENING MAMMO W/CAD



COMPLETED DATE/TIME:  1/31/2020 9:37 am



REASON FOR STUDY:  Z12.31 SCREENING MAMMO Z12.31  ENCNTR SCREEN MAMMOGRAM FOR MALIGNANT NEOPLASM OF B
RE



COMPARISON:  2009 and subsequent



EXAM PARAMETERS:  Standard craniocaudal and mediolateral oblique views of each breast recorded using 
digital acquisition.

Read with the assistance of CAD.

.[facility] [place]  Version 9.2



LIMITATIONS:  None.



FINDINGS:  No suspicious masses, suspicious calcifications or architectural distortion. No areas of c
oncern.



IMPRESSION:  Negative MAMMOGRAM.  BIRADS 1



BREAST DENSITY:  c. The breasts are heterogeneously dense, which may obscure small masses.



BIRAD:  ASSESSMENT:  1 NEGATIVE



RECOMMENDATION:  ROUTINE SCREENING



COMMENT:  The patient has been notified of the results by letter per MQSA requirements. Additional no
tification policies are in place for contacting patient with suspicious or incomplete findings.

Quality ID #225: The [employer] recommends an annual screening mammogram for women
 aged 40 years or over. This facility utilizes a reminder system to ensure that all patients receive 
reminder letters, and/or direct phone calls for appointments. This includes reminders for routine scr
eening mammograms, diagnostic mammograms, or other Breast Imaging Interventions when appropriate.  Th
is patient will be placed in the appropriate reminder system.



TECHNICAL DOCUMENTATION:  FINDING NUMBER: (1)

ASSESSMENT: (1)

JOB ID:  [id number]

 2011 [employer]- All Rights Reserved



Reading location - IP/workstation name: [email or web address]